# Patient Record
Sex: FEMALE | Race: BLACK OR AFRICAN AMERICAN | NOT HISPANIC OR LATINO | URBAN - METROPOLITAN AREA
[De-identification: names, ages, dates, MRNs, and addresses within clinical notes are randomized per-mention and may not be internally consistent; named-entity substitution may affect disease eponyms.]

---

## 2018-03-30 ENCOUNTER — EMERGENCY (EMERGENCY)
Facility: HOSPITAL | Age: 68
LOS: 1 days | Discharge: HOME | End: 2018-03-30
Attending: EMERGENCY MEDICINE | Admitting: EMERGENCY MEDICINE
Payer: MEDICARE

## 2018-03-30 VITALS
SYSTOLIC BLOOD PRESSURE: 162 MMHG | HEART RATE: 73 BPM | TEMPERATURE: 98 F | DIASTOLIC BLOOD PRESSURE: 87 MMHG | OXYGEN SATURATION: 99 % | RESPIRATION RATE: 16 BRPM | WEIGHT: 167.99 LBS

## 2018-03-30 VITALS
SYSTOLIC BLOOD PRESSURE: 159 MMHG | RESPIRATION RATE: 17 BRPM | HEART RATE: 68 BPM | DIASTOLIC BLOOD PRESSURE: 79 MMHG | TEMPERATURE: 99 F | OXYGEN SATURATION: 99 %

## 2018-03-30 DIAGNOSIS — Z91.040 LATEX ALLERGY STATUS: ICD-10-CM

## 2018-03-30 DIAGNOSIS — Z79.899 OTHER LONG TERM (CURRENT) DRUG THERAPY: ICD-10-CM

## 2018-03-30 DIAGNOSIS — R20.0 ANESTHESIA OF SKIN: ICD-10-CM

## 2018-03-30 DIAGNOSIS — R20.2 PARESTHESIA OF SKIN: ICD-10-CM

## 2018-03-30 DIAGNOSIS — R42 DIZZINESS AND GIDDINESS: ICD-10-CM

## 2018-03-30 DIAGNOSIS — I10 ESSENTIAL (PRIMARY) HYPERTENSION: ICD-10-CM

## 2018-03-30 LAB
ALBUMIN SERPL ELPH-MCNC: 4.5 G/DL — SIGNIFICANT CHANGE UP (ref 3.3–5)
ALP SERPL-CCNC: 87 U/L — SIGNIFICANT CHANGE UP (ref 40–120)
ALT FLD-CCNC: 24 U/L — SIGNIFICANT CHANGE UP (ref 10–45)
ANION GAP SERPL CALC-SCNC: 12 MMOL/L — SIGNIFICANT CHANGE UP (ref 5–17)
APTT BLD: 29.2 SEC — SIGNIFICANT CHANGE UP (ref 27.5–37.4)
AST SERPL-CCNC: 26 U/L — SIGNIFICANT CHANGE UP (ref 10–40)
BASOPHILS NFR BLD AUTO: 0.6 % — SIGNIFICANT CHANGE UP (ref 0–2)
BILIRUB SERPL-MCNC: 0.5 MG/DL — SIGNIFICANT CHANGE UP (ref 0.2–1.2)
BLD GP AB SCN SERPL QL: NEGATIVE — SIGNIFICANT CHANGE UP
BUN SERPL-MCNC: 13 MG/DL — SIGNIFICANT CHANGE UP (ref 7–23)
CALCIUM SERPL-MCNC: 9.1 MG/DL — SIGNIFICANT CHANGE UP (ref 8.4–10.5)
CHLORIDE SERPL-SCNC: 94 MMOL/L — LOW (ref 96–108)
CK MB CFR SERPL CALC: 1.8 NG/ML — SIGNIFICANT CHANGE UP (ref 0–6.7)
CK SERPL-CCNC: 203 U/L — HIGH (ref 25–170)
CO2 SERPL-SCNC: 30 MMOL/L — SIGNIFICANT CHANGE UP (ref 22–31)
CREAT SERPL-MCNC: 0.93 MG/DL — SIGNIFICANT CHANGE UP (ref 0.5–1.3)
EOSINOPHIL NFR BLD AUTO: 1 % — SIGNIFICANT CHANGE UP (ref 0–6)
GLUCOSE SERPL-MCNC: 120 MG/DL — HIGH (ref 70–99)
HCT VFR BLD CALC: 39.9 % — SIGNIFICANT CHANGE UP (ref 34.5–45)
HGB BLD-MCNC: 13.7 G/DL — SIGNIFICANT CHANGE UP (ref 11.5–15.5)
INR BLD: 1.04 — SIGNIFICANT CHANGE UP (ref 0.88–1.16)
LYMPHOCYTES # BLD AUTO: 39.3 % — SIGNIFICANT CHANGE UP (ref 13–44)
MCHC RBC-ENTMCNC: 30.2 PG — SIGNIFICANT CHANGE UP (ref 27–34)
MCHC RBC-ENTMCNC: 34.3 G/DL — SIGNIFICANT CHANGE UP (ref 32–36)
MCV RBC AUTO: 87.9 FL — SIGNIFICANT CHANGE UP (ref 80–100)
MONOCYTES NFR BLD AUTO: 16.2 % — HIGH (ref 2–14)
NEUTROPHILS NFR BLD AUTO: 42.9 % — LOW (ref 43–77)
PLATELET # BLD AUTO: 213 K/UL — SIGNIFICANT CHANGE UP (ref 150–400)
POTASSIUM SERPL-MCNC: 3.1 MMOL/L — LOW (ref 3.5–5.3)
POTASSIUM SERPL-SCNC: 3.1 MMOL/L — LOW (ref 3.5–5.3)
PROT SERPL-MCNC: 7.6 G/DL — SIGNIFICANT CHANGE UP (ref 6–8.3)
PROTHROM AB SERPL-ACNC: 11.6 SEC — SIGNIFICANT CHANGE UP (ref 9.8–12.7)
RBC # BLD: 4.54 M/UL — SIGNIFICANT CHANGE UP (ref 3.8–5.2)
RBC # FLD: 14.6 % — SIGNIFICANT CHANGE UP (ref 10.3–16.9)
RH IG SCN BLD-IMP: POSITIVE — SIGNIFICANT CHANGE UP
SODIUM SERPL-SCNC: 136 MMOL/L — SIGNIFICANT CHANGE UP (ref 135–145)
TROPONIN T SERPL-MCNC: <0.01 NG/ML — SIGNIFICANT CHANGE UP (ref 0–0.01)
WBC # BLD: 5.1 K/UL — SIGNIFICANT CHANGE UP (ref 3.8–10.5)
WBC # FLD AUTO: 5.1 K/UL — SIGNIFICANT CHANGE UP (ref 3.8–10.5)

## 2018-03-30 PROCEDURE — 99285 EMERGENCY DEPT VISIT HI MDM: CPT | Mod: 25

## 2018-03-30 PROCEDURE — 71045 X-RAY EXAM CHEST 1 VIEW: CPT | Mod: 26

## 2018-03-30 PROCEDURE — 70498 CT ANGIOGRAPHY NECK: CPT | Mod: 26

## 2018-03-30 PROCEDURE — 82962 GLUCOSE BLOOD TEST: CPT

## 2018-03-30 PROCEDURE — 86900 BLOOD TYPING SEROLOGIC ABO: CPT

## 2018-03-30 PROCEDURE — 71045 X-RAY EXAM CHEST 1 VIEW: CPT

## 2018-03-30 PROCEDURE — 70498 CT ANGIOGRAPHY NECK: CPT

## 2018-03-30 PROCEDURE — 85730 THROMBOPLASTIN TIME PARTIAL: CPT

## 2018-03-30 PROCEDURE — 84484 ASSAY OF TROPONIN QUANT: CPT

## 2018-03-30 PROCEDURE — 80053 COMPREHEN METABOLIC PANEL: CPT

## 2018-03-30 PROCEDURE — 93005 ELECTROCARDIOGRAM TRACING: CPT

## 2018-03-30 PROCEDURE — 70496 CT ANGIOGRAPHY HEAD: CPT

## 2018-03-30 PROCEDURE — 86901 BLOOD TYPING SEROLOGIC RH(D): CPT

## 2018-03-30 PROCEDURE — 93010 ELECTROCARDIOGRAM REPORT: CPT

## 2018-03-30 PROCEDURE — 99285 EMERGENCY DEPT VISIT HI MDM: CPT

## 2018-03-30 PROCEDURE — 82553 CREATINE MB FRACTION: CPT

## 2018-03-30 PROCEDURE — 70450 CT HEAD/BRAIN W/O DYE: CPT

## 2018-03-30 PROCEDURE — 85025 COMPLETE CBC W/AUTO DIFF WBC: CPT

## 2018-03-30 PROCEDURE — 70496 CT ANGIOGRAPHY HEAD: CPT | Mod: 26

## 2018-03-30 PROCEDURE — 86850 RBC ANTIBODY SCREEN: CPT

## 2018-03-30 PROCEDURE — 82550 ASSAY OF CK (CPK): CPT

## 2018-03-30 PROCEDURE — 85610 PROTHROMBIN TIME: CPT

## 2018-03-30 RX ORDER — VALSARTAN 80 MG/1
0 TABLET ORAL
Qty: 0 | Refills: 0 | COMMUNITY

## 2018-03-30 RX ORDER — AMLODIPINE BESYLATE 2.5 MG/1
1 TABLET ORAL
Qty: 0 | Refills: 0 | COMMUNITY

## 2018-03-30 RX ORDER — POTASSIUM CHLORIDE 20 MEQ
40 PACKET (EA) ORAL ONCE
Qty: 0 | Refills: 0 | Status: COMPLETED | OUTPATIENT
Start: 2018-03-30 | End: 2018-03-30

## 2018-03-30 RX ORDER — HYDROCHLOROTHIAZIDE 25 MG
0 TABLET ORAL
Qty: 0 | Refills: 0 | COMMUNITY

## 2018-03-30 RX ADMIN — Medication 40 MILLIEQUIVALENT(S): at 10:54

## 2018-03-30 NOTE — ED PROVIDER NOTE - ATTENDING CONTRIBUTION TO CARE
67F with pmh of above who is here with RUE tingling x 5d and R sided facial numbness since 930am, stroke code called by SHAKILA Naylor with my supervision. Seen by stroke team, attending Dr. Pagan. CT w/ lacunar infarct on R side, not consistent w/ current symptoms, no other ischemic findings, and given 5 days out, should see something. Does need MRI but given stability and minor symptoms, possible that this is all peripheral, will see stroke as outpatient. Dr. Riddle's info given and Dr. Pagan will try to expedite pt being seen in clinic. DC home in NAD with strict return precautions given.

## 2018-03-30 NOTE — ED PROVIDER NOTE - OBJECTIVE STATEMENT
67F with htn (norvasc, valsartan, hctz)    here with RUE tingling sensation x 5d, no exac/allev factors, came into ED for this, while in ED around 930am started developing acute onset R sided facial numbness over entire R side of face and in no specific distribution, thus stroke code called. +lightheadedness.     Denies f/c, headache, aphasia, dysarthria, chest pain, sob, copeland, abd pain, n/v/c/d, urinary complaints, gait disturbance, vision loss, unilateral weakness, facial asymmetry, trauma, ac use.

## 2018-03-30 NOTE — ED ADULT TRIAGE NOTE - CHIEF COMPLAINT QUOTE
" I have been having this vibrating sensation to my right arm for 4-5 days". Denies numbness, tingling, no swelling or redness , arm warm to touch, good radial pulse.

## 2018-03-30 NOTE — ED PROVIDER NOTE - MUSCULOSKELETAL, MLM
Spine appears normal, range of motion is not limited, no muscle or joint tenderness. ROM intact over all ext, +pulses.

## 2018-03-30 NOTE — ED PROVIDER NOTE - MEDICAL DECISION MAKING DETAILS
67F with pmh of above who is here with RUE tingling x 5d and R sided facial numbness since 930am, stroke code called. Plan for CTH/CTA H/N, labs, ekg, trops, reeval.

## 2018-03-30 NOTE — CONSULT NOTE ADULT - SUBJECTIVE AND OBJECTIVE BOX
STROKE CODE CONSULT NOTE      HPI: 68 yo F with HTN presents to ED with RUE tingling sensation x 5 days and develooped R-sided facial numbness while in ED at 9:30AM. 5 days ago, pt started to develop "vibratory" and tingling sensation in her R hand and forearm. This sensation extends more proximally over the last 5 days which prompted her to get evaluated in ED. WHile she was in ED, she developed right sided facial numbness, also described as "vibratory" sensation, with lightheadedness. Pt denies weakness in any extremities, headache, dizziness, visual or auditory disturbances, facial drooping, slurred speech, difficulty finding words, gait disturbances, chest pain, palpitations, or SOB. Pt denies recent fevers ,chills, or trauma . Patient takes Norvasc, valsartan-HCTZ for HTN. No personal or family history of stroke. No diabetes. Never smoker.      PAST MEDICAL & SURGICAL HISTORY:  Hypertension      REVIEW OF SYSTEMS:  As per HPI, otherwise negative for Constitutional, Eyes, Ears/Nose/Mouth/Throat, Neck, Cardiovascular, Respiratory, Gastrointestinal, Genitourinary, Skin, Endocrine, Musculoskeletal, Psychiatric, and Hematologic/Lymphatic.    HOME MEDICATIONS: Norvasc, Valsartan, Hydrochlorothiazide          Allergies    latex (Hives)  No Known Drug Allergies    Intolerances        FAMILY HISTORY:      SOCIAL HISTORY:  Never smoker, denies alcohol or illicit drug use    VITAL SIGNS:  Vital Signs Last 24 Hrs  T(C): 37 (30 Mar 2018 11:52), Max: 37 (30 Mar 2018 11:52)  T(F): 98.6 (30 Mar 2018 11:52), Max: 98.6 (30 Mar 2018 11:52)  HR: 68 (30 Mar 2018 11:52) (64 - 73)  BP: 159/79 (30 Mar 2018 11:52) (159/79 - 179/86)  BP(mean): --  RR: 17 (30 Mar 2018 11:52) (16 - 19)  SpO2: 99% (30 Mar 2018 11:52) (99% - 99%)    PHYSICAL EXAMINATION:  Constitutional: WDWN; NAD  Eyes: Conjunctiva and sclera clear.  Cardiovascular: Regular rate and rhythm; S1 and S2 Normal; No murmurs, gallops or rubs.  Neurologic:  Mental status: AAOx3, fluent speech  II: Visual fields intact.  III, IV, VI: EOMI. No nystagmus. PERRLA bilaterally.  V: Decreased sensation to light touch of Right V2 compared to L V2 distribution.   VII: smile symmetrical. Eyebrow elevation symmetrical. Eye closure symmetrical. No flattening of nasolabial fold.  VIII: grossly intact/symmetric hearing.  IX, X: palate elevation symmetrical.  XI: sternocleidomastoid 5R/5L, trapezius 5R/5L  XII: no tongue deviation.  Motor: no atrophy or fasciculations. No tremor. No hypo/hyperkinesia. No pronator drift. Tone normal. Finger tap rapid and equal on both sides. Strength: deltoids 5R/5L, biceps 5R/5L, triceps 5R/5L, wrist flexors 5R/5L, wrist extensors 5R/5L,  strong and equal R/L, hip flexors 5R/5L, leg flexors 5R/5L, leg extensors 5R/5L, ankle plantarflexion 5R/5L, ankle dorsiflexion 5R/5L  Reflexes: biceps 2R/2L, triceps 2R/2L, brachioradialis 2R/2L, patellar 2R/2L, ankles 2R/2L. Toes down R/L  Coordination: finger to nose without dysmetria or overshoot R/L.   Sensory: intact on R and L hemibody to light touch, pin, vibration, proprioception. Decreased sensation of lateral region in RUE. No difference of sensation between ulnar and median nerve distribution.  Gait: Deferred      LABS:                        13.7   5.1   )-----------( 213      ( 30 Mar 2018 10:01 )             39.9     03-30    136  |  94<L>  |  13  ----------------------------<  120<H>  3.1<L>   |  30  |  0.93    Ca    9.1      30 Mar 2018 10:01    TPro  7.6  /  Alb  4.5  /  TBili  0.5  /  DBili  x   /  AST  26  /  ALT  24  /  AlkPhos  87  03-30    PT/INR - ( 30 Mar 2018 10:01 )   PT: 11.6 sec;   INR: 1.04          PTT - ( 30 Mar 2018 10:01 )  PTT:29.2 sec      RADIOLOGY & ADDITIONAL STUDIES:      EXAM:  CT BRAIN STROKE PROTOCOL                          PROCEDURE DATE:  03/30/2018                     INTERPRETATION:  PROCEDURE: CT brain without intravenous contrast    INDICATION: CVA    TECHNIQUE: Multiple axial images were obtained at 5 mm intervals from the   skull base to the vertex. Sagittal and coronal reformatted images were   obtained from the axial data set. The images were reviewed in brain and   bone windows.    COMPARISON: None    FINDINGS: The CT examination demonstrates the ventricles, cisternal   spaces, and cortical sulci to be within normal limits. There is no   midline shift or extra axial collections. The gray white differentiation   appears within normal limits. There is no intracranial hemorrhage or   acute transcortical infarct. There is mild patchy hypodensity within the   periventricular and subcortical white matter which is nonspecific and may   represent the sequela of small vessel ischemic disease in this patient as   well is other etiologies. There is a 3 mm well-circumscribed hypodensity   within the right globus pallidus which may represent a lacunar infarct of   indeterminate age. The bony windows demonstrates no fractures. The   visualized paranasal sinuses are within normal limits. The mastoid air   cells are well aerated.    The study was performed at 9:49 and the above findings were discussed   with Dr. Reyes at 9:55 am..    IMPRESSION: No intracranial hemorrhage or acute transcortical infarct.   Right basal ganglia lacunar infarct of indeterminate age.     EXAM:  CT ANGIO BRAIN (W)AW IC                          EXAM:  CT ANGIO NECK (W)AW IC                          PROCEDURE DATE:  03/30/2018     100  [employer] 350   20           INTERPRETATION:  PROCEDURE: CTA brain with and without intravenous   contrast.    INDICATION: CVA    TECHNIQUE: Multiple thin section axial images were obtained through the   [place] following the intravenous injection of contrast. MPR   sagittal and coronal MIP images were generated from the axial images. 3-D   reconstructions were also obtained and postprocessed on a independent   workstation.    COMPARISON: None    FINDINGS: The CTA examination is limited by venous contamination. The   internal carotid arteries are normal in caliber. There is a normal   bifurcation into the A1 and M1 segments. There is a normal MCA   bifurcation. The vertebral arteries are normal in caliber. The basilar   artery is normal in caliber. There is a normal bifurcation into the   posterior cerebral arteries. There are no areas of stenosis, dilatation   or aneurysm.    IMPRESSION: No large vessel occlusion.      PROCEDURE: CTA neck with and without intravenous contrast.    INDICATION: CVA    TECHNIQUE: Multiple axial thin section were obtained through the neck   following the intravenous injection of contrast. MPR sagittal and coronal   MIP images were generated from the axial images. 3-D reconstructions were   also obtained and postprocessed on a independent workstation.    COMPARISON: None    FINDINGS: The CTA examination limited by venous contamination. The right   common carotid artery is normal in caliber. There is a normal bifurcation   into the right internal and external carotid arteries. There is no   hemodynamically significant stenosis.     The left common carotid artery is normal in caliber. There is poor   evaluation of the left carotid bifurcation secondary to venous   contamination obscuring detail. The distal left internal carotid artery   appears intact to the bifurcation.    The visualized vertebral arteries are normal in caliber.    The aortic arch appears intact without narrowing of the origin of the   great vessels.    IMPRESSION: Limited evaluation secondary to venous contamination. No   definite carotid stenosis.

## 2018-03-30 NOTE — ED ADULT NURSE REASSESSMENT NOTE - NS ED NURSE REASSESS COMMENT FT1
Pt started c/o of right sided facial numbness x 20 min while waiting to be seen by Provider. pt said as she has been sitting there started feeling right side of face turn numb.  Denies having this symptom in the last few days.  pt says right lower forearm still feels the same with the vibration burning sensation that has been there for the last 5 days.   No facial droop noted, no weakness or word slurring.  Went to talk to provider about symptoms and patient was moved to the north side of ED to be evaluated by the PA.  Transfer of care to nurse enriquez.
Received pt from TAZ Nielson from [facility]. Since pt arrived, she reported a new symptom of right sided facial sensation changes and right arm sensation changes. Pt states "it feels like vibrating in my arm and face". pt reports symptoms began around 9:20 am. Stroke code called, pt brought to CT.
Pt has ambulated to a bathroom with steady gait. Pt reports "I feel better now," and denies numbness to extremities at this time. Pt is able to speak coherently, no facial, droop, no arm drift. NAD noted.

## 2018-03-30 NOTE — ED ADULT NURSE NOTE - CHPI ED SYMPTOMS NEG
no weakness/no fever/no stiffness/no abrasion/no deformity/no tingling/no back pain/no numbness/no difficulty bearing weight

## 2018-03-30 NOTE — ED PROVIDER NOTE - NEUROLOGICAL, MLM
Alert and oriented, no focal deficits, no motor deficits, +sensory deficit over RUE and R side of face in comparison to contralateral side. Good symmetric hand /strength. No drift over UE/LEs. No dysmetria.

## 2018-03-30 NOTE — ED ADULT NURSE NOTE - OBJECTIVE STATEMENT
c/o of right lower arm "vibration" sensation.  pt says over the last 5 days has had intermittent "vibration" sensations from right elbow down to wrist. pt says the vibrations come and go but have been getting longer in duration.  denies numbness, weakness, tingling to extremity. denies cp, sob.

## 2019-07-25 NOTE — ED ADULT TRIAGE NOTE - ACCOMPANIED BY
POSTOP CHECK    Subjective:Doing well.     Objective:  Vitals:    07/25/19 1550   BP: 114/61   Pulse: 81   Resp: 18   Temp: 98.5 °F (36.9 °C)       I/O last 3 completed shifts:  In: 930 [P.O.:180; I.V.:750]  Out: 2100 [Urine:2100]  I/O this shift:  In: -   Out: 550 [Urine:550]      Oral:  Tonsil fossae with expected eschar, no active bleeding. Uvular edema marked.  Neck:  No adenopathy noted.  Neuro:  No focal deficits.    Assessment:  S/p tonsillectomy for sleep disordered breathing    Plan:  Overnight observation.  Decadron 10 mg x 1 IVPB.  Tylenol and Motrin ATC scheduled.  Oxycodone for breakthrough.  Encourage PO. Monitor I/O.  Continuous pulse ox.  Plan for discharge in morning if tolerating PO, pain control, and no bleeding.     Self

## 2022-04-28 PROBLEM — I10 ESSENTIAL (PRIMARY) HYPERTENSION: Chronic | Status: ACTIVE | Noted: 2018-03-30

## 2022-05-12 ENCOUNTER — APPOINTMENT (OUTPATIENT)
Dept: OTOLARYNGOLOGY | Facility: CLINIC | Age: 72
End: 2022-05-12

## 2022-05-12 VITALS
SYSTOLIC BLOOD PRESSURE: 144 MMHG | BODY MASS INDEX: 30.48 KG/M2 | WEIGHT: 172 LBS | HEIGHT: 63 IN | HEART RATE: 82 BPM | TEMPERATURE: 97.1 F | DIASTOLIC BLOOD PRESSURE: 92 MMHG

## 2022-05-18 ENCOUNTER — APPOINTMENT (OUTPATIENT)
Dept: OTOLARYNGOLOGY | Facility: CLINIC | Age: 72
End: 2022-05-18

## 2022-05-18 ENCOUNTER — APPOINTMENT (OUTPATIENT)
Dept: OTOLARYNGOLOGY | Facility: CLINIC | Age: 72
End: 2022-05-18
Payer: MEDICARE

## 2022-05-18 VITALS
BODY MASS INDEX: 30.48 KG/M2 | SYSTOLIC BLOOD PRESSURE: 134 MMHG | HEART RATE: 69 BPM | HEIGHT: 63 IN | WEIGHT: 172 LBS | TEMPERATURE: 97.3 F | DIASTOLIC BLOOD PRESSURE: 89 MMHG

## 2022-05-18 DIAGNOSIS — H90.3 SENSORINEURAL HEARING LOSS, BILATERAL: ICD-10-CM

## 2022-05-18 DIAGNOSIS — Z78.9 OTHER SPECIFIED HEALTH STATUS: ICD-10-CM

## 2022-05-18 DIAGNOSIS — R13.13 DYSPHAGIA, PHARYNGEAL PHASE: ICD-10-CM

## 2022-05-18 DIAGNOSIS — R42 DIZZINESS AND GIDDINESS: ICD-10-CM

## 2022-05-18 DIAGNOSIS — H83.2X9 LABYRINTHINE DYSFUNCTION, UNSPECIFIED EAR: ICD-10-CM

## 2022-05-18 DIAGNOSIS — Z82.49 FAMILY HISTORY OF ISCHEMIC HEART DISEASE AND OTHER DISEASES OF THE CIRCULATORY SYSTEM: ICD-10-CM

## 2022-05-18 DIAGNOSIS — Z86.79 PERSONAL HISTORY OF OTHER DISEASES OF THE CIRCULATORY SYSTEM: ICD-10-CM

## 2022-05-18 DIAGNOSIS — L25.3 UNSPECIFIED CONTACT DERMATITIS DUE TO OTHER CHEMICAL PRODUCTS: ICD-10-CM

## 2022-05-18 DIAGNOSIS — H93.13 TINNITUS, BILATERAL: ICD-10-CM

## 2022-05-18 DIAGNOSIS — R09.89 OTHER SPECIFIED SYMPTOMS AND SIGNS INVOLVING THE CIRCULATORY AND RESPIRATORY SYSTEMS: ICD-10-CM

## 2022-05-18 DIAGNOSIS — E04.1 NONTOXIC SINGLE THYROID NODULE: ICD-10-CM

## 2022-05-18 PROCEDURE — 92550 TYMPANOMETRY & REFLEX THRESH: CPT

## 2022-05-18 PROCEDURE — XXXXX: CPT | Mod: 1L

## 2022-05-18 PROCEDURE — 99204 OFFICE O/P NEW MOD 45 MIN: CPT | Mod: 1L,25

## 2022-05-18 PROCEDURE — 92557 COMPREHENSIVE HEARING TEST: CPT

## 2022-05-18 PROCEDURE — 31575 DIAGNOSTIC LARYNGOSCOPY: CPT | Mod: 1L

## 2022-08-22 ENCOUNTER — APPOINTMENT (OUTPATIENT)
Dept: NEUROLOGY | Facility: CLINIC | Age: 72
End: 2022-08-22

## 2022-09-30 PROBLEM — H90.3 BILATERAL HIGH FREQUENCY SENSORINEURAL HEARING LOSS: Status: ACTIVE | Noted: 2022-09-30

## 2022-09-30 PROBLEM — L25.3 LATEX ALLERGY, CONTACT DERMATITIS: Status: RESOLVED | Noted: 2022-09-30 | Resolved: 2022-09-30

## 2022-09-30 RX ORDER — AMLODIPINE BESYLATE 5 MG/1
5 TABLET ORAL
Refills: 0 | Status: ACTIVE | COMMUNITY

## 2022-09-30 RX ORDER — VALSARTAN AND HYDROCHLOROTHIAZIDE 160; 12.5 MG/1; MG/1
160-12.5 TABLET, FILM COATED ORAL
Refills: 0 | Status: ACTIVE | COMMUNITY

## 2022-09-30 NOTE — ADDENDUM
[FreeTextEntry1] : Documented by Shani Veronica acting as a scribe for Dr. Parris Parker on 05/18/2022.

## 2022-09-30 NOTE — DATA REVIEWED
[de-identified] : \par AUDIOGRAM  (5/18/2022)\par BILATERAL:  Hearing within normal limits sloping to a moderate HF HL\par Tympanograms  A  bilateral    \par Word recognition 100%  bilateral  \par

## 2022-09-30 NOTE — REVIEW OF SYSTEMS
[Patient Intake Form Reviewed] : Patient intake form was reviewed [Abdominal Pain] : abdominal pain [Negative] : Heme/Lymph [As Noted in HPI] : as noted in HPI [Dizziness] : dizziness [de-identified] : Latex allergy [FreeTextEntry5] : high bp

## 2022-09-30 NOTE — ASSESSMENT
[FreeTextEntry1] : Ms. GO is a 71 year old woman who has the following issues:\par \par 1.) Globus sensation in right lower throat for the past 2-3 months, along with occasional choking when she drinks liquids. No abnormality found on examination of laryngopharynx.  \par Right thyroid 2.5 cm nodule found on palpation, may be the cause of globus sensation.\par --> US thyroid\par \par 2.) Vestibular dysfunction since childhood, with more frequent imbalance for the past 10 years.\par Triggers include rapid head movement and changing from near to far vision quickly.\par Audiogram today showed bilateral moderate high frequency SNHL with excellent word recognition. \par --> Head exercises for imbalance\par --> VNG\par --> Vestibular therapy is likely after the VNG.\par \par Return after US.

## 2022-09-30 NOTE — PHYSICAL EXAM
[Normal] : mucosa is normal [Midline] : trachea located in midline position [Removed] : palatine tonsils previously removed [Laryngoscopy Performed] : laryngoscopy was performed, see procedure section for findings [FreeTextEntry1] : No hoarseness.  [de-identified] : No mass other than thyroid. [de-identified] : 2.5 cm nodule right thyroid lobe, moves with swallowing. [] : Aurora-Hallpike test is negative [de-identified] : Carotid pulses 2+ bilateral.  [de-identified] : Fakuda step test was normal.

## 2022-09-30 NOTE — HISTORY OF PRESENT ILLNESS
[de-identified] : Ms. GO is a 71 year old woman who was referred by Dr. Chapman for globus sensation.\par PMH: HTN\par \par For the past 2-3 months, she feels something stuck in her lower throat every day. No preceding event or illness. \par When she drinks liquids, she gags and feels like she is choking; then she feels something in the throat more. \par No problems swallowing pills or solids. \par Denies hoarseness. \par No throat pain, chest burning, hoarseness.  No history reflux.\par \pily Has vertigo for over 10 years -- feels like she is off-balance and she starts to lean to one side -- usually lasts a few seconds and occurs about once every few months.  Last imbalance occurred 3 weeks ago. She tries not to lean in order to avoid triggering the imbalance.  Moving head quickly when working on computer triggers episodes.\par When the imbalance occurs, "I pray, and it goes away". Once, for a bad episode, she went to the ER and was diagnosed with vertigo. \par She often has motion sickness.\par Occasional nonpulsatile ringing.  No changes in hearing.\par She is retired.  No head trauma.\par

## 2022-09-30 NOTE — CONSULT LETTER
[Dear  ___] : Dear  [unfilled], [( Thank you for referring [unfilled] for consultation for _____ )] : Thank you for referring [unfilled] for consultation for [unfilled] [Please see my note below.] : Please see my note below. [Consult Closing:] : Thank you very much for allowing me to participate in the care of this patient.  If you have any questions, please do not hesitate to contact me. [Sincerely,] : Sincerely, [___] : [unfilled] [FreeTextEntry2] : Toni Chapman M.D.\par [address] [place] [address]\[place] [id number]  [FreeTextEntry3] : \par Parris Parker MD \par Otolaryngology, Head and Neck Surgery \par \par

## 2022-09-30 NOTE — END OF VISIT
[Time Spent: ___ minutes] : I have spent [unfilled] minutes of time on the encounter. [FreeTextEntry3] : All medical record entries made by the Scribe were at my, Dr. Parris Parker, direction and personally dictated by me on 05/18/2022. I have reviewed the chart and agree that the record accurately reflects my personal performance of the history, physical exam, assessment and plan. I have also personally directed, reviewed, and agreed with the chart.

## 2022-09-30 NOTE — PROCEDURE
[de-identified] :  \par Indication: globus sensation\par -Verbal consent was obtained from patient prior to procedure.\par -[place]-Synephrine and lidocaine 2% spray applied to the nasal cavities.\par Flexible laryngoscopy was performed via right nostril and revealed the following:\par   -- Nasopharynx had no mass or exudate. S/p adenoidectomy. \par   -- Base of tongue was symmetric and not enlarged.\par   -- Vallecula was clear\par   -- Epiglottis, both aryepiglottic folds and both false vocal folds were normal\par   -- Arytenoids both with mild edema and no erythema \par   -- True vocal folds were fully mobile and without lesions. \par   -- Post cricoid area was clear.\par   -- Interarytenoid edema was absent.\par   -- No lesions in laryngopharynx\par \par The patient tolerated the procedure well.

## 2022-11-05 ENCOUNTER — EMERGENCY (EMERGENCY)
Facility: HOSPITAL | Age: 72
LOS: 1 days | Discharge: HOME | End: 2022-11-05
Attending: EMERGENCY MEDICINE | Admitting: EMERGENCY MEDICINE
Payer: COMMERCIAL

## 2022-11-05 VITALS
HEART RATE: 82 BPM | DIASTOLIC BLOOD PRESSURE: 81 MMHG | HEIGHT: 63 IN | RESPIRATION RATE: 17 BRPM | OXYGEN SATURATION: 97 % | SYSTOLIC BLOOD PRESSURE: 151 MMHG | WEIGHT: 169.98 LBS | TEMPERATURE: 98 F

## 2022-11-05 VITALS
SYSTOLIC BLOOD PRESSURE: 143 MMHG | OXYGEN SATURATION: 97 % | HEART RATE: 71 BPM | RESPIRATION RATE: 18 BRPM | TEMPERATURE: 98 F | DIASTOLIC BLOOD PRESSURE: 85 MMHG

## 2022-11-05 LAB
ALBUMIN SERPL ELPH-MCNC: 4.5 G/DL — SIGNIFICANT CHANGE UP (ref 3.3–5)
ALP SERPL-CCNC: 96 U/L — SIGNIFICANT CHANGE UP (ref 40–120)
ALT FLD-CCNC: 43 U/L — SIGNIFICANT CHANGE UP (ref 10–45)
ANION GAP SERPL CALC-SCNC: 13 MMOL/L — SIGNIFICANT CHANGE UP (ref 5–17)
AST SERPL-CCNC: 43 U/L — HIGH (ref 10–40)
BASOPHILS # BLD AUTO: 0.02 K/UL — SIGNIFICANT CHANGE UP (ref 0–0.2)
BASOPHILS NFR BLD AUTO: 0.4 % — SIGNIFICANT CHANGE UP (ref 0–2)
BILIRUB SERPL-MCNC: 0.4 MG/DL — SIGNIFICANT CHANGE UP (ref 0.2–1.2)
BUN SERPL-MCNC: 15 MG/DL — SIGNIFICANT CHANGE UP (ref 7–23)
CALCIUM SERPL-MCNC: 9 MG/DL — SIGNIFICANT CHANGE UP (ref 8.4–10.5)
CHLORIDE SERPL-SCNC: 95 MMOL/L — LOW (ref 96–108)
CO2 SERPL-SCNC: 26 MMOL/L — SIGNIFICANT CHANGE UP (ref 22–31)
CREAT SERPL-MCNC: 1.04 MG/DL — SIGNIFICANT CHANGE UP (ref 0.5–1.3)
EGFR: 58 ML/MIN/1.73M2 — LOW
EOSINOPHIL # BLD AUTO: 0.01 K/UL — SIGNIFICANT CHANGE UP (ref 0–0.5)
EOSINOPHIL NFR BLD AUTO: 0.2 % — SIGNIFICANT CHANGE UP (ref 0–6)
GLUCOSE SERPL-MCNC: 125 MG/DL — HIGH (ref 70–99)
HCT VFR BLD CALC: 43.2 % — SIGNIFICANT CHANGE UP (ref 34.5–45)
HGB BLD-MCNC: 14.5 G/DL — SIGNIFICANT CHANGE UP (ref 11.5–15.5)
IMM GRANULOCYTES NFR BLD AUTO: 0.2 % — SIGNIFICANT CHANGE UP (ref 0–0.9)
LYMPHOCYTES # BLD AUTO: 2.02 K/UL — SIGNIFICANT CHANGE UP (ref 1–3.3)
LYMPHOCYTES # BLD AUTO: 44.3 % — HIGH (ref 13–44)
MCHC RBC-ENTMCNC: 29.2 PG — SIGNIFICANT CHANGE UP (ref 27–34)
MCHC RBC-ENTMCNC: 33.6 GM/DL — SIGNIFICANT CHANGE UP (ref 32–36)
MCV RBC AUTO: 87.1 FL — SIGNIFICANT CHANGE UP (ref 80–100)
MONOCYTES # BLD AUTO: 0.64 K/UL — SIGNIFICANT CHANGE UP (ref 0–0.9)
MONOCYTES NFR BLD AUTO: 14 % — SIGNIFICANT CHANGE UP (ref 2–14)
NEUTROPHILS # BLD AUTO: 1.86 K/UL — SIGNIFICANT CHANGE UP (ref 1.8–7.4)
NEUTROPHILS NFR BLD AUTO: 40.9 % — LOW (ref 43–77)
NRBC # BLD: 0 /100 WBCS — SIGNIFICANT CHANGE UP (ref 0–0)
PLATELET # BLD AUTO: 242 K/UL — SIGNIFICANT CHANGE UP (ref 150–400)
POTASSIUM SERPL-MCNC: 3 MMOL/L — LOW (ref 3.5–5.3)
POTASSIUM SERPL-SCNC: 3 MMOL/L — LOW (ref 3.5–5.3)
PROT SERPL-MCNC: 7.4 G/DL — SIGNIFICANT CHANGE UP (ref 6–8.3)
RBC # BLD: 4.96 M/UL — SIGNIFICANT CHANGE UP (ref 3.8–5.2)
RBC # FLD: 14.9 % — HIGH (ref 10.3–14.5)
SARS-COV-2 RNA SPEC QL NAA+PROBE: POSITIVE
SODIUM SERPL-SCNC: 134 MMOL/L — LOW (ref 135–145)
TROPONIN T SERPL-MCNC: <0.01 NG/ML — SIGNIFICANT CHANGE UP (ref 0–0.01)
WBC # BLD: 4.56 K/UL — SIGNIFICANT CHANGE UP (ref 3.8–10.5)
WBC # FLD AUTO: 4.56 K/UL — SIGNIFICANT CHANGE UP (ref 3.8–10.5)

## 2022-11-05 PROCEDURE — 80053 COMPREHEN METABOLIC PANEL: CPT

## 2022-11-05 PROCEDURE — 71046 X-RAY EXAM CHEST 2 VIEWS: CPT

## 2022-11-05 PROCEDURE — 99285 EMERGENCY DEPT VISIT HI MDM: CPT | Mod: 25

## 2022-11-05 PROCEDURE — 85025 COMPLETE CBC W/AUTO DIFF WBC: CPT

## 2022-11-05 PROCEDURE — 84484 ASSAY OF TROPONIN QUANT: CPT

## 2022-11-05 PROCEDURE — 93005 ELECTROCARDIOGRAM TRACING: CPT

## 2022-11-05 PROCEDURE — 36415 COLL VENOUS BLD VENIPUNCTURE: CPT

## 2022-11-05 PROCEDURE — 71046 X-RAY EXAM CHEST 2 VIEWS: CPT | Mod: 26

## 2022-11-05 PROCEDURE — 87635 SARS-COV-2 COVID-19 AMP PRB: CPT

## 2022-11-05 PROCEDURE — 99284 EMERGENCY DEPT VISIT MOD MDM: CPT | Mod: 25

## 2022-11-05 NOTE — ED PROVIDER NOTE - PATIENT PORTAL LINK FT
You can access the FollowMyHealth Patient Portal offered by [facility] by registering at the following website: [email or web address]. By joining [employer]’s FollowMyHealth portal, you will also be able to view your health information using other applications (apps) compatible with our system.

## 2022-11-05 NOTE — ED ADULT NURSE NOTE - OBJECTIVE STATEMENT
Pt presents to ED endorsing generalized chest discomfort s/p sick x 1 week. Pt states she had fever, cough, generalized malaise. Pt denies SOB, fever today.

## 2022-11-05 NOTE — ED PROVIDER NOTE - OBJECTIVE STATEMENT
71 yof with history of htn here with chest heaviness.  pt had a viral illness last week with fever to 101+.  recovered but continued 71 yof with history of htn here with chest heaviness.  pt had a viral illness last week with fever to 101+.  recovered but continued to feel chest heaviness.    denies copeland, chest pain, dizziness.

## 2022-11-08 DIAGNOSIS — Z91.040 LATEX ALLERGY STATUS: ICD-10-CM

## 2022-11-08 DIAGNOSIS — I10 ESSENTIAL (PRIMARY) HYPERTENSION: ICD-10-CM

## 2022-11-08 DIAGNOSIS — U07.1 COVID-19: ICD-10-CM

## 2022-11-08 DIAGNOSIS — Z88.8 ALLERGY STATUS TO OTHER DRUGS, MEDICAMENTS AND BIOLOGICAL SUBSTANCES STATUS: ICD-10-CM

## 2022-11-08 DIAGNOSIS — R07.89 OTHER CHEST PAIN: ICD-10-CM

## 2023-08-28 NOTE — ED ADULT NURSE NOTE - DURATION
day(s) Consent 1/Introductory Paragraph: The rationale for Mohs was explained to the patient and consent was obtained. The risks, benefits and alternatives to therapy were discussed in detail. Specifically, the risks of infection, scarring, bleeding, prolonged wound healing, incomplete removal, allergy to anesthesia, nerve injury and recurrence were addressed. Prior to the procedure, the treatment site was clearly identified and confirmed by the patient. All components of Universal Protocol/PAUSE Rule completed.

## 2025-01-23 ENCOUNTER — EMERGENCY (EMERGENCY)
Facility: HOSPITAL | Age: 75
LOS: 1 days | Discharge: HOME | End: 2025-01-23
Attending: EMERGENCY MEDICINE | Admitting: EMERGENCY MEDICINE
Payer: COMMERCIAL

## 2025-01-23 VITALS
DIASTOLIC BLOOD PRESSURE: 108 MMHG | WEIGHT: 171.96 LBS | HEART RATE: 78 BPM | SYSTOLIC BLOOD PRESSURE: 240 MMHG | RESPIRATION RATE: 17 BRPM | OXYGEN SATURATION: 97 % | HEIGHT: 63 IN | TEMPERATURE: 98 F

## 2025-01-23 VITALS
SYSTOLIC BLOOD PRESSURE: 197 MMHG | DIASTOLIC BLOOD PRESSURE: 90 MMHG | HEART RATE: 75 BPM | RESPIRATION RATE: 17 BRPM | OXYGEN SATURATION: 98 %

## 2025-01-23 DIAGNOSIS — Z91.040 LATEX ALLERGY STATUS: ICD-10-CM

## 2025-01-23 DIAGNOSIS — Z88.8 ALLERGY STATUS TO OTHER DRUGS, MEDICAMENTS AND BIOLOGICAL SUBSTANCES: ICD-10-CM

## 2025-01-23 DIAGNOSIS — I10 ESSENTIAL (PRIMARY) HYPERTENSION: ICD-10-CM

## 2025-01-23 DIAGNOSIS — T50.2X6A UNDERDOSING OF CARBONIC-ANHYDRASE INHIBITORS, BENZOTHIADIAZIDES AND OTHER DIURETICS, INITIAL ENCOUNTER: ICD-10-CM

## 2025-01-23 LAB
ANION GAP SERPL CALC-SCNC: 14 MMOL/L — SIGNIFICANT CHANGE UP (ref 5–17)
BASOPHILS # BLD AUTO: 0.05 K/UL — SIGNIFICANT CHANGE UP (ref 0–0.2)
BASOPHILS NFR BLD AUTO: 0.7 % — SIGNIFICANT CHANGE UP (ref 0–2)
BUN SERPL-MCNC: 11 MG/DL — SIGNIFICANT CHANGE UP (ref 7–23)
CALCIUM SERPL-MCNC: 9.4 MG/DL — SIGNIFICANT CHANGE UP (ref 8.4–10.5)
CHLORIDE SERPL-SCNC: 99 MMOL/L — SIGNIFICANT CHANGE UP (ref 96–108)
CO2 SERPL-SCNC: 26 MMOL/L — SIGNIFICANT CHANGE UP (ref 22–31)
CREAT SERPL-MCNC: 0.77 MG/DL — SIGNIFICANT CHANGE UP (ref 0.5–1.3)
EGFR: 81 ML/MIN/1.73M2 — SIGNIFICANT CHANGE UP
EGFR: 81 ML/MIN/1.73M2 — SIGNIFICANT CHANGE UP
EOSINOPHIL # BLD AUTO: 0.18 K/UL — SIGNIFICANT CHANGE UP (ref 0–0.5)
EOSINOPHIL NFR BLD AUTO: 2.6 % — SIGNIFICANT CHANGE UP (ref 0–6)
GLUCOSE SERPL-MCNC: 120 MG/DL — HIGH (ref 70–99)
HCT VFR BLD CALC: 45.9 % — HIGH (ref 34.5–45)
HGB BLD-MCNC: 15.4 G/DL — SIGNIFICANT CHANGE UP (ref 11.5–15.5)
IMM GRANULOCYTES NFR BLD AUTO: 0.4 % — SIGNIFICANT CHANGE UP (ref 0–0.9)
LYMPHOCYTES # BLD AUTO: 2.41 K/UL — SIGNIFICANT CHANGE UP (ref 1–3.3)
LYMPHOCYTES # BLD AUTO: 34.2 % — SIGNIFICANT CHANGE UP (ref 13–44)
MCHC RBC-ENTMCNC: 29.9 PG — SIGNIFICANT CHANGE UP (ref 27–34)
MCHC RBC-ENTMCNC: 33.6 G/DL — SIGNIFICANT CHANGE UP (ref 32–36)
MCV RBC AUTO: 89.1 FL — SIGNIFICANT CHANGE UP (ref 80–100)
MONOCYTES # BLD AUTO: 0.47 K/UL — SIGNIFICANT CHANGE UP (ref 0–0.9)
MONOCYTES NFR BLD AUTO: 6.7 % — SIGNIFICANT CHANGE UP (ref 2–14)
NEUTROPHILS # BLD AUTO: 3.91 K/UL — SIGNIFICANT CHANGE UP (ref 1.8–7.4)
NEUTROPHILS NFR BLD AUTO: 55.4 % — SIGNIFICANT CHANGE UP (ref 43–77)
NRBC # BLD: 0 /100 WBCS — SIGNIFICANT CHANGE UP (ref 0–0)
NRBC BLD-RTO: 0 /100 WBCS — SIGNIFICANT CHANGE UP (ref 0–0)
PLATELET # BLD AUTO: 263 K/UL — SIGNIFICANT CHANGE UP (ref 150–400)
POTASSIUM SERPL-MCNC: 3.6 MMOL/L — SIGNIFICANT CHANGE UP (ref 3.5–5.3)
POTASSIUM SERPL-SCNC: 3.6 MMOL/L — SIGNIFICANT CHANGE UP (ref 3.5–5.3)
RBC # BLD: 5.15 M/UL — SIGNIFICANT CHANGE UP (ref 3.8–5.2)
RBC # FLD: 15.3 % — HIGH (ref 10.3–14.5)
SODIUM SERPL-SCNC: 139 MMOL/L — SIGNIFICANT CHANGE UP (ref 135–145)
WBC # BLD: 7.05 K/UL — SIGNIFICANT CHANGE UP (ref 3.8–10.5)
WBC # FLD AUTO: 7.05 K/UL — SIGNIFICANT CHANGE UP (ref 3.8–10.5)

## 2025-01-23 PROCEDURE — 36415 COLL VENOUS BLD VENIPUNCTURE: CPT

## 2025-01-23 PROCEDURE — 85025 COMPLETE CBC W/AUTO DIFF WBC: CPT

## 2025-01-23 PROCEDURE — 99284 EMERGENCY DEPT VISIT MOD MDM: CPT

## 2025-01-23 PROCEDURE — 96376 TX/PRO/DX INJ SAME DRUG ADON: CPT

## 2025-01-23 PROCEDURE — 96374 THER/PROPH/DIAG INJ IV PUSH: CPT

## 2025-01-23 PROCEDURE — 80048 BASIC METABOLIC PNL TOTAL CA: CPT

## 2025-01-23 PROCEDURE — 99284 EMERGENCY DEPT VISIT MOD MDM: CPT | Mod: 25

## 2025-01-23 RX ADMIN — Medication 5 MILLIGRAM(S): at 11:43

## 2025-01-23 RX ADMIN — Medication 5 MILLIGRAM(S): at 11:07
